# Patient Record
(demographics unavailable — no encounter records)

---

## 2024-10-11 NOTE — PHYSICAL EXAM
[Appropriately responsive] : appropriately responsive [Alert] : alert [No Acute Distress] : no acute distress [No Lymphadenopathy] : no lymphadenopathy [Soft] : soft [Non-tender] : non-tender [Non-distended] : non-distended [No HSM] : No HSM [No Lesions] : no lesions [No Mass] : no mass [Oriented x3] : oriented x3 [Examination Of The Breasts] : a normal appearance [No Masses] : no breast masses were palpable [Labia Majora] : normal [Labia Minora] : normal [Atrophy] : atrophy [Normal] : normal [Uterine Adnexae] : normal [Normal rectal exam] : was normal [Chaperone Declined] : Patient declined chaperone [External Hemorrhoid] : external hemorrhoid was present [Occult Blood Positive] : was negative for occult blood analysis

## 2024-10-11 NOTE — DISCUSSION/SUMMARY
[FreeTextEntry1] : Oldest going to grad school in [place], 2nd studying at [employer] for exercise medicine, set of twins looking at colleges now!

## 2024-10-11 NOTE — HISTORY OF PRESENT ILLNESS
[FreeTextEntry1] : Patient is a 56 yo female here today for annual visit. She denies any GYN complaints at this time. Essure for BC.   PMH: C/Sx4 (has set of twins)

## 2024-10-11 NOTE — PLAN
[FreeTextEntry1] : Patient to follow up in 1 year for annual GYN exam reviewed estrogen vaginal cream for atrophy currently declines at this time.  Mammogram and bilateral breast US due: now Colonoscopy due: 04/29 per [facility] Bone density due: 8/25 Pap ordered Hemoccult ordered All questions answered, patient is agreeable with plan.

## 2024-11-18 NOTE — PHYSICAL EXAM
[de-identified] : Right Hip: Range of Motion in Degrees: 	                                 Claimant:	          Normal:	 Flexion (Active) 	                 120 	          120-degrees	 Flexion (Passive)	                 120	          120-degrees	 Extension (Active)	                 -30	          -30-degrees	 Extension (Passive)	 -30	          -30-degrees	 Abduction (Active)	                45-50	          51-78-ejdycnn	 Abduction (Passive)	45-50	          19-09-djciwsp	 Adduction (Active)                	20-30	          50-47-kyozjxa	 Adduction (Passive)	20-30	          29-27-xfkecnu	 Internal Rotation (Active) 	35	          35-degrees	 Internal Rotation (Passive)	35	          35-degrees	 External Rotation (Active)	45	          45-degrees	 External Rotation (Passive)	45	          45-degrees	  No tenderness with internal or external rotation or axial load.  Point tenderness over the greater trochanter with pain with resisted abduction.  Negative Trendelenburg.  No weakness to flexion, extension, abduction or adduction.  No evidence of instability.  No motor or sensory deficits.  2+ DP and PT pulses.  Skin is intact.  No scars, rashes or lesions.     [de-identified] : Gait and Station:  Ambulating with a slightly antalgic to antalgic gait.  Normal Station.  [de-identified] : Appearance:  Well-developed, well-nourished female in no acute distress.   [de-identified] : Radiographs, two to three views of the right hip and pelvis taken in the office today, show no change.

## 2024-11-18 NOTE — ADDENDUM
[FreeTextEntry1] : This note was written by Mark Padron on 11/12/2024, acting as a scribe for David Darling III, MD

## 2024-11-18 NOTE — DISCUSSION/SUMMARY
[de-identified] : At this time, due to chronic right hip trochanteric bursitis, we had a long discussion.  At this point, she does not want any further intervention, and she wants to proceed with a schedule loss.  According to the [place] Workers' Compensation guidelines, I recommend a schedule loss of 7.5%.  The Workers' Compensation guidelines have been followed.

## 2024-11-18 NOTE — DISCUSSION/SUMMARY
[de-identified] : At this time, due to chronic right hip trochanteric bursitis, we had a long discussion.  At this point, she does not want any further intervention, and she wants to proceed with a schedule loss.  According to the [place] Workers' Compensation guidelines, I recommend a schedule loss of 7.5%.  The Workers' Compensation guidelines have been followed.

## 2024-11-18 NOTE — REASON FOR VISIT
[Follow-Up Visit] : a follow-up visit for [FreeTextEntry2] : her right hip.  This visit is related to Workers' Compensation.  DOI:  11/15/22

## 2024-11-18 NOTE — HISTORY OF PRESENT ILLNESS
[de-identified] : The patient comes in today for a schedule loss.  She does have persistent complaints of pain to her right hip.  She had to switch jobs because she needed a more sedentary position.

## 2024-11-18 NOTE — DISCUSSION/SUMMARY
Please revise to 3% [de-identified] : At this time, due to chronic right hip trochanteric bursitis, we had a long discussion.  At this point, she does not want any further intervention, and she wants to proceed with a schedule loss.  According to the [place] Workers' Compensation guidelines, I recommend a schedule loss of 7.5%.  The Workers' Compensation guidelines have been followed.

## 2024-11-18 NOTE — PHYSICAL EXAM
[de-identified] : Right Hip: Range of Motion in Degrees: 	                                 Claimant:	          Normal:	 Flexion (Active) 	                 120 	          120-degrees	 Flexion (Passive)	                 120	          120-degrees	 Extension (Active)	                 -30	          -30-degrees	 Extension (Passive)	 -30	          -30-degrees	 Abduction (Active)	                45-50	          41-35-cwujgbt	 Abduction (Passive)	45-50	          87-86-slcvhti	 Adduction (Active)                	20-30	          31-26-lxyysza	 Adduction (Passive)	20-30	          37-29-orpaail	 Internal Rotation (Active) 	35	          35-degrees	 Internal Rotation (Passive)	35	          35-degrees	 External Rotation (Active)	45	          45-degrees	 External Rotation (Passive)	45	          45-degrees	  No tenderness with internal or external rotation or axial load.  Point tenderness over the greater trochanter with pain with resisted abduction.  Negative Trendelenburg.  No weakness to flexion, extension, abduction or adduction.  No evidence of instability.  No motor or sensory deficits.  2+ DP and PT pulses.  Skin is intact.  No scars, rashes or lesions.     [de-identified] : Gait and Station:  Ambulating with a slightly antalgic to antalgic gait.  Normal Station.  [de-identified] : Appearance:  Well-developed, well-nourished female in no acute distress.   [de-identified] : Radiographs, two to three views of the right hip and pelvis taken in the office today, show no change.

## 2024-11-18 NOTE — PHYSICAL EXAM
[de-identified] : Right Hip: Range of Motion in Degrees: 	                                 Claimant:	          Normal:	 Flexion (Active) 	                 120 	          120-degrees	 Flexion (Passive)	                 120	          120-degrees	 Extension (Active)	                 -30	          -30-degrees	 Extension (Passive)	 -30	          -30-degrees	 Abduction (Active)	                45-50	          05-44-gcwblcb	 Abduction (Passive)	45-50	          58-40-vibwodx	 Adduction (Active)                	20-30	          59-74-owkqqgq	 Adduction (Passive)	20-30	          28-00-khzkbop	 Internal Rotation (Active) 	35	          35-degrees	 Internal Rotation (Passive)	35	          35-degrees	 External Rotation (Active)	45	          45-degrees	 External Rotation (Passive)	45	          45-degrees	  No tenderness with internal or external rotation or axial load.  Point tenderness over the greater trochanter with pain with resisted abduction.  Negative Trendelenburg.  No weakness to flexion, extension, abduction or adduction.  No evidence of instability.  No motor or sensory deficits.  2+ DP and PT pulses.  Skin is intact.  No scars, rashes or lesions.     [de-identified] : Gait and Station:  Ambulating with a slightly antalgic to antalgic gait.  Normal Station.  [de-identified] : Appearance:  Well-developed, well-nourished female in no acute distress.   [de-identified] : Radiographs, two to three views of the right hip and pelvis taken in the office today, show no change.

## 2024-11-18 NOTE — HISTORY OF PRESENT ILLNESS
[de-identified] : The patient comes in today for a schedule loss.  She does have persistent complaints of pain to her right hip.  She had to switch jobs because she needed a more sedentary position.

## 2024-11-20 NOTE — PHYSICAL EXAM
[de-identified] : Right Shoulder:   Range of Motion in Degrees:	 	                                                       Claimant:	          Normal:	 Abduction (Active)	                                 180	               180 degrees	 Abduction (Passive)	                         180	               180 degrees	 Forward elevation (Active):	                 180	               180 degrees	 Forward elevation (Passive):	                 180	               180 degrees	 External rotation (Active):	                  45	               45 degrees	 External rotation (Passive):	                  45	               45 degrees	 Internal rotation (Active):	                          L-1	                 L-1	 Internal rotation (Passive):	                  L-1	                 L-1	   No motor weakness to internal rotation, external rotation or abduction in the scapular plane.  Negative crank test.  Negative Piute's test.  Negative Speeds test. Negative Yergason's test.  Negative cross arm test.  No tenderness to palpation at the AC joint. Positive Swartz sign.  Positive Neers sign. Negative apprehension. Negative sulcus sign.  No gross neurological or vascular deficits distally.  Skin is intact.  No rashes, scars or lesions. 2+ radial and ulnar pulses. No extra-articular swelling or tenderness.   [de-identified] : Ambulating with a normal gait. [de-identified] : Appearance:  Well-developed, well-nourished female in no acute distress.   [de-identified] : Radiographs, which were taken in the office today, two views of the right shoulder, show no interval changes.

## 2024-11-20 NOTE — HISTORY OF PRESENT ILLNESS
[de-identified] : The patient comes in today with some persistent complaints to her right shoulder. Unable to assess

## 2024-11-20 NOTE — ADDENDUM
[FreeTextEntry1] : This note was written by Kamini Gutiérrez on 11/20/2024 acting as a scribe for ABIGAIL SIDDIQUI III, MD

## 2024-11-20 NOTE — DISCUSSION/SUMMARY
[de-identified] : At this time, due to impingement of the right shoulder, I recommend she undergo a course of therapy. She will be reassessed in six weeks.